# Patient Record
Sex: FEMALE | Race: WHITE | NOT HISPANIC OR LATINO | URBAN - NONMETROPOLITAN AREA
[De-identification: names, ages, dates, MRNs, and addresses within clinical notes are randomized per-mention and may not be internally consistent; named-entity substitution may affect disease eponyms.]

---

## 2019-03-04 ENCOUNTER — IMPORTED ENCOUNTER (OUTPATIENT)
Dept: URBAN - NONMETROPOLITAN AREA CLINIC 1 | Facility: CLINIC | Age: 64
End: 2019-03-04

## 2019-03-04 PROBLEM — H52.13: Noted: 2019-03-04

## 2019-03-04 PROBLEM — H52.4: Noted: 2019-03-04

## 2019-03-04 PROBLEM — H52.223: Noted: 2019-03-04

## 2019-03-04 PROCEDURE — 92014 COMPRE OPH EXAM EST PT 1/>: CPT

## 2019-03-04 PROCEDURE — 92015 DETERMINE REFRACTIVE STATE: CPT

## 2019-05-07 ENCOUNTER — IMPORTED ENCOUNTER (OUTPATIENT)
Dept: URBAN - NONMETROPOLITAN AREA CLINIC 1 | Facility: CLINIC | Age: 64
End: 2019-05-07

## 2019-05-07 PROBLEM — H25.13: Noted: 2019-05-07

## 2019-05-07 PROBLEM — H52.4: Noted: 2019-05-07

## 2019-05-07 PROBLEM — H52.223: Noted: 2019-05-07

## 2019-05-07 PROBLEM — H52.13: Noted: 2019-05-07

## 2019-05-07 PROBLEM — S05.01XA: Noted: 2019-05-07

## 2019-05-07 PROBLEM — H04.123: Noted: 2019-05-07

## 2019-05-07 PROCEDURE — 92071 CONTACT LENS FITTING FOR TX: CPT

## 2019-05-07 PROCEDURE — 99213 OFFICE O/P EST LOW 20 MIN: CPT

## 2019-05-07 NOTE — PATIENT DISCUSSION
Corneal Abrasion OS:  -  Discussed findings w/ pt today-  Signs/symptoms associated discussed-  Continue Tbramycin drops QID for now-  BCL placed OS:  B&L PureVision -  RTC tomorrow for recheck ------------------ notes below are from last visit -------------------------Cataracts OUDiscussed diagnosis with patient. Reviewed symptoms related to cataract progression. Discussed various treatment options with patient. No treatment required at this time. Continue to monitor. MGD/STUART OUDiscussed findings in detail w/ pt todaySigns/symptoms associated discussedAdvised to continue hot moist compresses dailyAdvised to continue ATs OU BID PRNContineu to monitor PRNFamily hx of ARMDNo findings on exam todayMonitor for changes PRNMyopia/astig/presbyDiscussed refractive status in detail with patient. New glasses Rx given today. Continue to monitor.

## 2019-05-08 ENCOUNTER — IMPORTED ENCOUNTER (OUTPATIENT)
Dept: URBAN - NONMETROPOLITAN AREA CLINIC 1 | Facility: CLINIC | Age: 64
End: 2019-05-08

## 2019-05-08 PROCEDURE — 99213 OFFICE O/P EST LOW 20 MIN: CPT

## 2019-05-08 PROCEDURE — 92071 CONTACT LENS FITTING FOR TX: CPT

## 2019-05-08 NOTE — PATIENT DISCUSSION
Corneal Abrasion OS:  Resolved- Discussed diagnosis in detail with patient - Signs/symptoms associated discussed- D/C Tbramycin drops QID for now- Removed BCL today with out complications - Explained to patient that her eyes are dry and the foreign body sensation that she is feeling is coming from the dryness (No foreign body seen today)- Recommend Refresh Repair through out the day- Inserted BCL today due to patient feeling like their is something in her eye still  ** Total 1 Day 8.5 / 14.1 / +0.50 / LOT # [id number] / EXP date 09/2021**- Sample of Refresh optove PF given - RTC tomorrow for recheck ------------------ notes below are from last visit -------------------------Cataracts OUDiscussed diagnosis with patient. Reviewed symptoms related to cataract progression. Discussed various treatment options with patient. No treatment required at this time. Continue to monitor. MGD/STUART OUDiscussed findings in detail w/ pt todaySigns/symptoms associated discussedAdvised to continue hot moist compresses dailyAdvised to continue ATs OU BID PRNContineu to monitor PRNFamily hx of ARMDNo findings on exam todayMonitor for changes PRNMyopia/astig/presbyDiscussed refractive status in detail with patient. New glasses Rx given today. Continue to monitor.

## 2019-05-09 ENCOUNTER — IMPORTED ENCOUNTER (OUTPATIENT)
Dept: URBAN - NONMETROPOLITAN AREA CLINIC 1 | Facility: CLINIC | Age: 64
End: 2019-05-09

## 2019-05-09 PROCEDURE — 99213 OFFICE O/P EST LOW 20 MIN: CPT

## 2019-05-09 NOTE — PATIENT DISCUSSION
Corneal Abrasion OS:  Resolved- Discussed diagnosis in detail with patient - Signs/symptoms associated discussed- Resolved doing much better. - BCL removed today. - Continue Refresh Repair through out the day- Sample of Refresh optove PF given last visit- RTC a/s ------------------ notes below are from last visit -------------------------Cataracts OUDiscussed diagnosis with patient. Reviewed symptoms related to cataract progression. Discussed various treatment options with patient. No treatment required at this time. Continue to monitor. MGD/STUART OUDiscussed findings in detail w/ pt todaySigns/symptoms associated discussedAdvised to continue hot moist compresses dailyAdvised to continue ATs OU BID PRNContineu to monitor PRNFamily hx of ARMDNo findings on exam todayMonitor for changes PRNMyopia/astig/presbyDiscussed refractive status in detail with patient. New glasses Rx given today. Continue to monitor.

## 2020-03-09 ENCOUNTER — IMPORTED ENCOUNTER (OUTPATIENT)
Dept: URBAN - NONMETROPOLITAN AREA CLINIC 1 | Facility: CLINIC | Age: 65
End: 2020-03-09

## 2020-03-09 PROBLEM — H52.223: Noted: 2020-03-09

## 2020-03-09 PROBLEM — H52.13: Noted: 2020-03-09

## 2020-03-09 PROBLEM — H40.013: Noted: 2020-03-09

## 2020-03-09 PROBLEM — H25.13: Noted: 2020-03-09

## 2020-03-09 PROBLEM — H52.4: Noted: 2020-03-09

## 2020-03-09 PROBLEM — H04.123: Noted: 2020-03-09

## 2020-03-09 PROCEDURE — 92014 COMPRE OPH EXAM EST PT 1/>: CPT

## 2020-03-09 PROCEDURE — 92015 DETERMINE REFRACTIVE STATE: CPT

## 2020-03-09 NOTE — PATIENT DISCUSSION
Cataracts OUDiscussed diagnosis with patient. Reviewed symptoms related to cataract progression. Discussed various treatment options with patient. No treatment required at this time. Continue to monitor. Glaucoma Suspect OUDiscussed diagnosis in detail with patient. No family history of disease. IOP at 14 OUCup to disc noted at 0.6 OD and 0. 4. Continue to monitor. RTC in 6 months with VF 24-2 OCT and Pach MGD/STUART OUDiscussed findings in detail w/ pt todaySigns/symptoms associated discussedAdvised to continue hot moist compresses dailyAdvised to continue ATs OU BID PRNContineu to monitor PRNFamily hx of ARMDNo findings on exam todayMonitor for changes PRNMyopia/astig/presbyDiscussed refractive status in detail with patient. New glasses Rx given today. Continue to monitor.; 's Notes: MR 3/9/20DFE 3/9/20VFOCTOPTOSGONIOPACH

## 2022-04-09 ASSESSMENT — TONOMETRY
OS_IOP_MMHG: 15
OD_IOP_MMHG: 15
OS_IOP_MMHG: 15
OD_IOP_MMHG: 15
OD_IOP_MMHG: 14
OD_IOP_MMHG: 16
OD_IOP_MMHG: 14
OS_IOP_MMHG: 14

## 2022-04-09 ASSESSMENT — VISUAL ACUITY
OS_SC: 20/25-
OU_CC: 20/25+
OS_SC: 20/40
OD_SC: 20/25-
OD_SC: 20/22
OS_SC: 20/30+
OD_SC: 20/30+
OS_PH: 20/25
OD_SC: 20/22-2
OD_SC: 20/25-